# Patient Record
Sex: MALE | HISPANIC OR LATINO | Employment: FULL TIME | URBAN - METROPOLITAN AREA
[De-identification: names, ages, dates, MRNs, and addresses within clinical notes are randomized per-mention and may not be internally consistent; named-entity substitution may affect disease eponyms.]

---

## 2018-08-04 ENCOUNTER — HOSPITAL ENCOUNTER (EMERGENCY)
Facility: MEDICAL CENTER | Age: 23
End: 2018-08-04

## 2018-08-04 VITALS
TEMPERATURE: 98.1 F | DIASTOLIC BLOOD PRESSURE: 70 MMHG | SYSTOLIC BLOOD PRESSURE: 110 MMHG | BODY MASS INDEX: 24.83 KG/M2 | HEART RATE: 98 BPM | WEIGHT: 149.03 LBS | RESPIRATION RATE: 16 BRPM | OXYGEN SATURATION: 98 % | HEIGHT: 65 IN

## 2018-08-04 PROCEDURE — 302449 STATCHG TRIAGE ONLY (STATISTIC)

## 2018-08-04 NOTE — ED TRIAGE NOTES
"Chief Complaint   Patient presents with   • Foot Pain     Kicked cone that had metal eyelet with right foot about 4 days prior   • Hearing Loss     Pt states decrease in hearing in both ears for about 3-4 months   /70   Pulse 98   Temp 36.7 °C (98.1 °F)   Resp 16   Ht 1.651 m (5' 5\")   Wt 67.6 kg (149 lb 0.5 oz)   SpO2 98%   BMI 24.80 kg/m²    Pt ambulated to triage with above complaints.  Pt has bruising and minor swelling with no deformity noted to right foot and #1 digit.  Pt states hearing has been decreased to where he has some trouble hearing others.  Triage process explained to pt.  Pt asking if he will be able to be discharged to be at work by 0500 this morning.  RN explained to pt that it would depend on what his injuries are and what the POC would be.  Pt states that he would prefer to come back later today.  Pt encouraged to at least see the doctor but pt states that he wants to leave and come back later.  Pt signed AMA paperwork.  Charge RN notified.  "

## 2018-10-03 ENCOUNTER — NON-PROVIDER VISIT (OUTPATIENT)
Dept: URGENT CARE | Facility: CLINIC | Age: 23
End: 2018-10-03

## 2018-10-03 DIAGNOSIS — Z02.1 PRE-EMPLOYMENT DRUG SCREENING: ICD-10-CM

## 2018-10-03 DIAGNOSIS — Z02.83 ENCOUNTER FOR DRUG SCREENING: ICD-10-CM

## 2018-10-03 LAB
AMP AMPHETAMINE: NORMAL
BAR BARBITURATES: NORMAL
BZO BENZODIAZEPINES: NORMAL
COC COCAINE: NORMAL
INT CON NEG: NORMAL
INT CON POS: NORMAL
MDMA ECSTASY: NORMAL
MET METHAMPHETAMINES: NORMAL
MTD METHADONE: NORMAL
OPI OPIATES: NORMAL
OXY OXYCODONE: NORMAL
PCP PHENCYCLIDINE: NORMAL
POC URINE DRUG SCREEN OCDRS: NORMAL
THC: NORMAL

## 2018-10-03 PROCEDURE — 80305 DRUG TEST PRSMV DIR OPT OBS: CPT | Performed by: PHYSICIAN ASSISTANT

## 2018-10-15 ENCOUNTER — APPOINTMENT (OUTPATIENT)
Dept: RADIOLOGY | Facility: MEDICAL CENTER | Age: 23
End: 2018-10-15
Attending: EMERGENCY MEDICINE

## 2018-10-15 ENCOUNTER — HOSPITAL ENCOUNTER (EMERGENCY)
Facility: MEDICAL CENTER | Age: 23
End: 2018-10-15
Attending: EMERGENCY MEDICINE

## 2018-10-15 VITALS
OXYGEN SATURATION: 97 % | SYSTOLIC BLOOD PRESSURE: 119 MMHG | TEMPERATURE: 97.2 F | RESPIRATION RATE: 16 BRPM | HEIGHT: 66 IN | DIASTOLIC BLOOD PRESSURE: 70 MMHG | WEIGHT: 145.28 LBS | BODY MASS INDEX: 23.35 KG/M2 | HEART RATE: 84 BPM

## 2018-10-15 DIAGNOSIS — S29.011A MUSCLE STRAIN OF CHEST WALL, INITIAL ENCOUNTER: ICD-10-CM

## 2018-10-15 PROCEDURE — 71046 X-RAY EXAM CHEST 2 VIEWS: CPT

## 2018-10-15 PROCEDURE — 99283 EMERGENCY DEPT VISIT LOW MDM: CPT

## 2018-10-15 NOTE — ED TRIAGE NOTES
Patient was at work pushing equipment and working when he noted a pop in the middle of his chest.  NO sob, no major cp, no symptoms otherwise.  Patient had recent hospitalization for pna and states part of his right lung was removed.

## 2018-10-15 NOTE — ED PROVIDER NOTES
"ED Provider Note    Scribed for Shaina Conley M.D. by Bogdan Cabello. 10/15/2018  4:10 PM    Primary care provider: Pcp Pt States None  Means of arrival: Walk In  History obtained from: Patient  History limited by: None     CHIEF COMPLAINT  Chief Complaint   Patient presents with   • Other     HPI  Chas Holley is a 23 y.o. male who presents to the Emergency Department for chest injury.   The patient has a past medical history of recent thoracotomy of right lung for MRSA infection of lung.   The patient was at work today, and was pushing equipment forward when he heard \"a pop\" in his mid chest. The patient denies any significant pain or symptoms. The patient denies any chest pain or shortness of breath.    He presents to the ED because he is concerned about his injury today considering his recent right thoracotomy.     The patient is a current every smoker. He uses marijuana regularly. He denies any other drug abuse.      REVIEW OF SYSTEMS  HEENT:  No ear pain, congestion, or sore throat   EYES: no discharge, redness, or vision changes  CARDIAC: no chest pain, no palpitations    PULMONARY: no dyspnea, cough, or congestion   SKIN: no rash, erythema, or contusions     See history of present illness.     PAST MEDICAL HISTORY       SURGICAL HISTORY  patient denies any surgical history    SOCIAL HISTORY  Social History   Substance Use Topics   • Smoking status: Current Every Day Smoker   • Smokeless tobacco: Current User   • Alcohol use No      History   Drug Use   • Types: Inhaled     Comment: marjuana, mushrooms, acid      FAMILY HISTORY  History reviewed. No pertinent family history.    CURRENT MEDICATIONS  Home Medications     Reviewed by Sameera Dodson R.N. (Registered Nurse) on 10/15/18 at 6231  Med List Status: Complete   Medication Last Dose Status        Patient Joey Taking any Medications                     ALLERGIES  No Known Allergies    PHYSICAL EXAM  VITAL SIGNS: /75   Pulse 91   Temp 36.2 °C " "(97.2 °F)   Resp 16   Ht 1.676 m (5' 6\")   Wt 65.9 kg (145 lb 4.5 oz)   SpO2 97%   BMI 23.45 kg/m²     Constitutional: Well developed, Well nourished, No acute distress, Non-toxic appearance.   HEENT: Normocephalic, Atraumatic,  external ears normal, pharynx pink,  Mucous  Membranes moist, No rhinorrhea or mucosal edema  Eyes: PERRL, EOMI, Conjunctiva normal, No discharge.   Neck: Normal range of motion, No tenderness, Supple, No stridor.   Cardiovascular: Regular Rate and Rhythm, No murmurs,  rubs, or gallops.   Thorax & Lungs: Lungs clear to auscultation bilaterally, Speaking in full sentences. No respiratory distress, No wheezes, rhales or rhonchi. Right sided thoracotomy scar, no tenderness or erythema. Tender initially over left lower costal margin that meets the sternum and anteriorly, that has resolved.   Skin: Warm, Dry, No erythema, No rash,       DIAGNOSTIC STUDIES / PROCEDURES    RADIOLOGY  DX-CHEST-2 VIEWS   Final Result      Postoperative changes right hemothorax. No consolidation.        The radiologist's interpretation of all radiological studies have been reviewed by me.    COURSE & MEDICAL DECISION MAKING  Nursing notes, VS, PMSFHx reviewed in chart.    4:10 PM Patient seen and examined at bedside. Ordered Chest X Ray to evaluate his symptoms.  On exam, the patient initially had some tenderness over the left lower costal margin that meets the sternum and also some anterior tenderness, which has resolved.       5:26 PM Recheck: Patient re-evaluated at beside. The patient was updated of his Chest X Ray results which did not show any consolidation. This is likely a muscular strain.         DISPOSITION:  Patient will be discharged home in stable condition.    FOLLOW UP:  [address]  [address]  [place] [address] [phone]  Call in 1 day  for recheck, As needed, If symptoms worsen      FINAL IMPRESSION  1. Muscle strain of chest wall, initial encounter  "         IBogdan (Scribe), am scribing for, and in the presence of, Shaina Conley M.D..    Electronically signed by: Bogdan Cabello (Darnell), 10/15/2018    Shaina HARDING M.D. personally performed the services described in this documentation, as scribed by Bogdan Cabello in my presence, and it is both accurate and complete.    The note accurately reflects work and decisions made by me.  Shaina Conley  10/15/2018  7:40 PM

## 2018-10-16 ENCOUNTER — HOSPITAL ENCOUNTER (EMERGENCY)
Facility: MEDICAL CENTER | Age: 23
End: 2018-10-16

## 2018-10-18 ENCOUNTER — HOSPITAL ENCOUNTER (EMERGENCY)
Dept: HOSPITAL 8 - ED | Age: 23
Discharge: HOME | End: 2018-10-18
Payer: COMMERCIAL

## 2018-10-18 VITALS — WEIGHT: 141.1 LBS | BODY MASS INDEX: 22.68 KG/M2 | HEIGHT: 66 IN
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
## 2018-10-18 DIAGNOSIS — R07.89: Primary | ICD-10-CM
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
## 2018-10-18 PROCEDURE — 99283 EMERGENCY DEPT VISIT LOW MDM: CPT
